# Patient Record
Sex: FEMALE | Race: BLACK OR AFRICAN AMERICAN | URBAN - METROPOLITAN AREA
[De-identification: names, ages, dates, MRNs, and addresses within clinical notes are randomized per-mention and may not be internally consistent; named-entity substitution may affect disease eponyms.]

---

## 2017-04-21 ENCOUNTER — OFFICE VISIT (OUTPATIENT)
Dept: FAMILY MEDICINE CLINIC | Age: 22
End: 2017-04-21

## 2017-04-21 VITALS
WEIGHT: 163 LBS | SYSTOLIC BLOOD PRESSURE: 110 MMHG | DIASTOLIC BLOOD PRESSURE: 64 MMHG | OXYGEN SATURATION: 98 % | HEART RATE: 100 BPM | RESPIRATION RATE: 15 BRPM | BODY MASS INDEX: 30 KG/M2 | HEIGHT: 62 IN | TEMPERATURE: 97.7 F

## 2017-04-21 DIAGNOSIS — Z23 ENCOUNTER FOR IMMUNIZATION: ICD-10-CM

## 2017-04-21 DIAGNOSIS — Z00.00 PHYSICAL EXAM: Primary | ICD-10-CM

## 2017-04-21 NOTE — MR AVS SNAPSHOT
Visit Information Date & Time Provider Department Dept. Phone Encounter #  
 4/21/2017  2:00 PM Gregory Sandoval MD [facility] [phone] [id number] Upcoming Health Maintenance Date Due Hepatitis A Peds Age 1-18 (1 of 2 - Standard Series) 8/25/1997 HPV AGE 9Y-26Y (1 of 3 - Female 3 Dose Series) 8/25/2007 INFLUENZA AGE 9 TO ADULT 8/1/2016 DTaP/Tdap/Td series (2 - Td) 5/19/2017 Allergies as of 4/21/2017  Review Complete On: 4/21/2017 By: Leslie Saucedo LPN Severity Noted Reaction Type Reactions Penicillins  08/28/2015    Hives Current Immunizations  Never Reviewed Name Date  
 TB Skin Test (PPD) Intradermal 4/21/2017 Tdap 4/21/2017 Not reviewed this visit You Were Diagnosed With   
  
 Codes Comments Physical exam    -  Primary ICD-10-CM: Z00.00 ICD-9-CM: V70.9 Encounter for immunization     ICD-10-CM: S35 ICD-9-CM: V03.89 Vitals BP Pulse Temp Resp Height(growth percentile) Weight(growth percentile) 110/64 100 97.7 °F (36.5 °C) (Temporal) 15 5' 2\" (1.575 m) 163 lb (73.9 kg) LMP SpO2 BMI OB Status Smoking Status 04/10/2017 98% 29.81 kg/m2 Having regular periods Never Smoker Vitals History BMI and BSA Data Body Mass Index Body Surface Area  
 29.81 kg/m 2 1.8 m 2 Your Updated Medication List  
  
   
This list is accurate as of: 4/21/17  3:00 PM.  Always use your most recent med list.  
  
  
  
  
 LEVBID 0.375 mg SR tablet Generic drug:  hyoscyamine SR Take 375 mcg by mouth every twelve (12) hours as needed for Cramping. PriLOSEC 40 mg capsule Generic drug:  omeprazole Take 40 mg by mouth daily. We Performed the Following AMB POC TUBERCULOSIS, INTRADERMAL (SKIN TEST) [69361 CPT(R)] TETANUS, DIPHTHERIA TOXOIDS AND ACELLULAR PERTUSSIS VACCINE (TDAP), IN INDIVIDS. >=7, IM [id number] CPT(R)] Patient Instructions Tuberculin Skin Test: Care Instructions Your Care Instructions Tuberculosis (TB) is a bacterial infection that can damage the lungs or other parts of the body. The TB skin test can tell if you have TB bacteria in your body. Many people are exposed to TB and test positive for TB bacteria in their bodies, but they don't get the disease. TB bacteria can stay in your body without making you sick. This is because your immune system can keep TB in check. Your doctor may want you to have a TB skin test if you have been in close contact with someone who has TB. Or you may need the test if you have symptoms that might be caused by TB, such as a cough that does not go away, a fever, or weight loss. You also may get the test if you are a health care worker. During the skin test, part of a TB bacterium is injected under your skin. The test will feel like a skin prick. If you have TB bacteria in your body, a firm red bump will form at the shot site within 2 days. If the test shows that you are infected with TB (positive), your doctor probably will order more tests. A TB-positive skin test can't tell when you became infected with TB. And it can't tell whether the infection can be passed to others. Follow-up care is a key part of your treatment and safety. Be sure to make and go to all appointments, and call your doctor if you are having problems. It's also a good idea to know your test results and keep a list of the medicines you take. How can you care for yourself at home? · Do not scratch the test site. Scratching it may cause redness or swelling. This could affect the test results. · To ease itching, put a cold washcloth on the site. Then pat the site dry. · Do not cover the test site with a bandage or other dressing. · Go back to your doctor's office or hospital to have the test read on the follow-up date. This must be done between 48 and 72 hours after you get the shot. When should you call for help? Watch closely for changes in your health, and be sure to contact your doctor if: 
· You did not have your TB skin test checked by your doctor. · You have a fever or swelling in your arm. · You do not get better as expected. Where can you learn more? Go to http://marisol-kike.info/. Enter ([phone] in the search box to learn more about \"Tuberculin Skin Test: Care Instructions. \" Current as of: May 24, 2016 Content Version: 11.2 © 4657-4781 [employer]. Care instructions adapted under license by [employer] (which disclaims liability or warranty for this information). If you have questions about a medical condition or this instruction, always ask your healthcare professional. [address] any warranty or liability for your use of this information. Introducing [facility]! Anna Marie Saha introduces [employer] patient portal. Now you can access parts of your medical record, email your doctor's office, and request medication refills online. 1. In your internet browser, go to [email or web address] 2. Click on the First Time User? Click Here link in the Sign In box. You will see the New Member Sign Up page. 3. Enter your [employer] Access Code exactly as it appears below. You will not need to use this code after youve completed the sign-up process. If you do not sign up before the expiration date, you must request a new code. · [employer] Access Code: [email or web address] Expires: 7/20/2017  3:00 PM 
 
4. Enter the last four digits of your Social Security Number (xxxx) and Date of Birth (mm/dd/yyyy) as indicated and click Submit. You will be taken to the next sign-up page. 5. Create a [employer] ID. This will be your [employer] login ID and cannot be changed, so think of one that is secure and easy to remember. 6. Create a [employer] password. You can change your password at any time. 7. Enter your Password Reset Question and Answer. This can be used at a later time if you forget your password. 8. Enter your e-mail address. You will receive e-mail notification when new information is available in [address] 9. Click Sign Up. You can now view and download portions of your medical record. 10. Click the Download Summary menu link to download a portable copy of your medical information. If you have questions, please visit the Frequently Asked Questions section of the [employer]ojin website. Remember, [employer]oj[employer] is NOT to be used for urgent needs. For medical emergencies, dial 911. Now available from your iPhone and Android! Please provide this summary of care documentation to your next provider. If you have any questions after today's visit, please call [phone].

## 2017-04-21 NOTE — PROGRESS NOTES
Subjective:   Amrit Burger is a 22 y.o. female who presents for a  physical exam. She denies any current medical problems or concerns. Questionnaire and forms reviewed with her and responses verified. Allergies   Allergen Reactions    Penicillins Hives      Review of Systems    A comprehensive review of systems was negative except for that written in the HPI. Objective:     Visit Vitals    /64    Pulse 100    Temp 97.7 °F (36.5 °C) (Temporal)    Resp 15    Ht 5' 2\" (1.575 m)    Wt 163 lb (73.9 kg)    LMP 04/10/2017    SpO2 98%    BMI 29.81 kg/m2     General:  Alert, cooperative, no distress, appears stated age. Head:  Normocephalic, without obvious abnormality, atraumatic. Eyes:  Conjunctivae/corneas clear. PERRL, EOMs intact. Fundi benign. Ears:  Normal TMs and external ear canals both ears. Nose: Nares normal. Septum midline. Mucosa normal. No drainage or sinus tenderness. Throat: Lips, mucosa, and tongue normal. Teeth and gums normal.   Neck: Supple, symmetrical, trachea midline, no adenopathy, thyroid: no enlargement/tenderness/nodules, no carotid bruit and no JVD. Back:   Symmetric, no curvature. ROM normal. No CVA tenderness. Lungs:   Clear to auscultation bilaterally. Chest wall:  No tenderness or deformity. Heart:  Regular rate and rhythm, S1, S2 normal, no murmur, click, rub or gallop. Abdomen:   Soft, non-tender. Bowel sounds normal. No masses,  No organomegaly. Extremities: Extremities normal, atraumatic, no cyanosis or edema. Pulses: 2+ and symmetric all extremities. Skin: Skin color, texture, turgor normal. No rashes or lesions. Lymph nodes: Cervical, supraclavicular, and axillary nodes normal.   Neurologic: CNII-XII intact. Normal strength, sensation and reflexes throughout. Assessment/Plan:   Ms. Lonny Thomas is a healthy 22 y.o.  female who is medically cleared for school  . ICD-10-CM ICD-9-CM    1.  Physical exam Z00.00 V70.9    2.  Encounter for immunization Z23 V03.89 AMB POC TUBERCULOSIS, INTRADERMAL (SKIN TEST)      TETANUS, DIPHTHERIA TOXOIDS AND ACELLULAR PERTUSSIS VACCINE (TDAP), IN INDIVIDS. >=7, IM     Follow-up Disposition: Not on Princess Amaya MD

## 2017-04-21 NOTE — PATIENT INSTRUCTIONS
Tuberculin Skin Test: Care Instructions  Your Care Instructions    Tuberculosis (TB) is a bacterial infection that can damage the lungs or other parts of the body. The TB skin test can tell if you have TB bacteria in your body. Many people are exposed to TB and test positive for TB bacteria in their bodies, but they don't get the disease. TB bacteria can stay in your body without making you sick. This is because your immune system can keep TB in check. Your doctor may want you to have a TB skin test if you have been in close contact with someone who has TB. Or you may need the test if you have symptoms that might be caused by TB, such as a cough that does not go away, a fever, or weight loss. You also may get the test if you are a health care worker. During the skin test, part of a TB bacterium is injected under your skin. The test will feel like a skin prick. If you have TB bacteria in your body, a firm red bump will form at the shot site within 2 days. If the test shows that you are infected with TB (positive), your doctor probably will order more tests. A TB-positive skin test can't tell when you became infected with TB. And it can't tell whether the infection can be passed to others. Follow-up care is a key part of your treatment and safety. Be sure to make and go to all appointments, and call your doctor if you are having problems. It's also a good idea to know your test results and keep a list of the medicines you take. How can you care for yourself at home? · Do not scratch the test site. Scratching it may cause redness or swelling. This could affect the test results. · To ease itching, put a cold washcloth on the site. Then pat the site dry. · Do not cover the test site with a bandage or other dressing. · Go back to your doctor's office or hospital to have the test read on the follow-up date. This must be done between 48 and 72 hours after you get the shot. When should you call for help?   Watch closely for changes in your health, and be sure to contact your doctor if:  · You did not have your TB skin test checked by your doctor. · You have a fever or swelling in your arm. · You do not get better as expected. Where can you learn more? Go to http://marisol-kike.info/. Enter [phone] in the search box to learn more about \"Tuberculin Skin Test: Care Instructions. \"  Current as of: May 24, 2016  Content Version: 11.2  © 6436-1733 [employer]. Care instructions adapted under license by "[employer] (which disclaims liability or warranty for this information). If you have questions about a medical condition or this instruction, always ask your healthcare professional. [address] any warranty or liability for your use of this information.

## 2017-04-24 LAB
MM INDURATION POC: NORMAL MM (ref 0–5)
PPD POC: NORMAL NEGATIVE

## 2025-08-09 ENCOUNTER — HOSPITAL ENCOUNTER (OUTPATIENT)
Facility: CLINIC | Age: 30
Discharge: HOME | End: 2025-08-09
Attending: FAMILY MEDICINE
Payer: COMMERCIAL

## 2025-08-09 VITALS
DIASTOLIC BLOOD PRESSURE: 62 MMHG | RESPIRATION RATE: 18 BRPM | OXYGEN SATURATION: 99 % | HEIGHT: 61 IN | SYSTOLIC BLOOD PRESSURE: 114 MMHG | TEMPERATURE: 98.6 F | BODY MASS INDEX: 42.48 KG/M2 | WEIGHT: 225 LBS | HEART RATE: 81 BPM

## 2025-08-09 DIAGNOSIS — J02.9 SORE THROAT: Primary | ICD-10-CM

## 2025-08-09 DIAGNOSIS — K12.0 ORAL APHTHOUS ULCER: ICD-10-CM

## 2025-08-09 PROBLEM — M54.50 LOW BACK PAIN: Status: ACTIVE | Noted: 2019-05-20

## 2025-08-09 PROBLEM — L20.9 ATOPIC DERMATITIS: Status: ACTIVE | Noted: 2025-08-09

## 2025-08-09 PROBLEM — E66.01 MORBID OBESITY (CMS/HCC): Status: ACTIVE | Noted: 2021-02-26

## 2025-08-09 PROBLEM — M62.838 TRAPEZIUS MUSCLE SPASM: Status: ACTIVE | Noted: 2025-08-09

## 2025-08-09 PROBLEM — B35.9 TINEA: Status: ACTIVE | Noted: 2025-08-09

## 2025-08-09 PROBLEM — E55.9 VITAMIN D DEFICIENCY: Status: ACTIVE | Noted: 2019-05-28

## 2025-08-09 PROBLEM — E78.5 HYPERLIPIDEMIA: Status: ACTIVE | Noted: 2025-08-09

## 2025-08-09 PROBLEM — F41.9 ANXIETY: Status: ACTIVE | Noted: 2025-08-09

## 2025-08-09 PROBLEM — G62.9 PERIPHERAL NERVE DISEASE: Status: ACTIVE | Noted: 2019-05-28

## 2025-08-09 PROBLEM — E53.8 COBALAMIN DEFICIENCY: Status: ACTIVE | Noted: 2019-05-28

## 2025-08-09 PROBLEM — L25.9 CONTACT DERMATITIS: Status: ACTIVE | Noted: 2025-08-09

## 2025-08-09 PROBLEM — D25.9 UTERINE FIBROID: Status: ACTIVE | Noted: 2025-08-09

## 2025-08-09 PROBLEM — G89.29 CHRONIC PELVIC PAIN IN FEMALE: Status: ACTIVE | Noted: 2025-08-09

## 2025-08-09 PROBLEM — R10.2 CHRONIC PELVIC PAIN IN FEMALE: Status: ACTIVE | Noted: 2025-08-09

## 2025-08-09 PROBLEM — L70.9 ACNE: Status: ACTIVE | Noted: 2025-08-09

## 2025-08-09 PROBLEM — K21.9 GASTROESOPHAGEAL REFLUX DISEASE: Status: ACTIVE | Noted: 2021-02-26

## 2025-08-09 PROBLEM — L03.90 CELLULITIS: Status: ACTIVE | Noted: 2021-04-28

## 2025-08-09 PROBLEM — L21.9 SEBORRHEIC DERMATITIS: Status: ACTIVE | Noted: 2025-08-09

## 2025-08-09 LAB
EXPIRATION DATE: NORMAL
EXPIRATION DATE: NORMAL
HETEROPH AB SER QL LA: NEGATIVE
Lab: NORMAL
Lab: NORMAL
POCT MANUFACTURER: NORMAL
POCT MANUFACTURER: NORMAL
S PYO AG THROAT QL: NEGATIVE

## 2025-08-09 PROCEDURE — 87880 STREP A ASSAY W/OPTIC: CPT | Performed by: FAMILY MEDICINE

## 2025-08-09 PROCEDURE — 87081 CULTURE SCREEN ONLY: CPT | Performed by: FAMILY MEDICINE

## 2025-08-09 PROCEDURE — 99203 OFFICE O/P NEW LOW 30 MIN: CPT | Performed by: FAMILY MEDICINE

## 2025-08-09 PROCEDURE — 86308 HETEROPHILE ANTIBODY SCREEN: CPT | Performed by: FAMILY MEDICINE

## 2025-08-09 PROCEDURE — S9083 URGENT CARE CENTER GLOBAL: HCPCS | Performed by: FAMILY MEDICINE

## 2025-08-09 RX ORDER — NORETHINDRONE ACETATE AND ETHINYL ESTRADIOL, AND FERROUS FUMARATE 1MG-20(24)
1 KIT ORAL DAILY
COMMUNITY
Start: 2025-08-01

## 2025-08-09 RX ORDER — TIRZEPATIDE 2.5 MG/.5ML
2.5 INJECTION, SOLUTION SUBCUTANEOUS
COMMUNITY
Start: 2025-05-23

## 2025-08-09 RX ORDER — BUSPIRONE HYDROCHLORIDE 10 MG/1
10 TABLET ORAL 2 TIMES DAILY
COMMUNITY
Start: 2025-08-01

## 2025-08-09 RX ORDER — SERTRALINE HYDROCHLORIDE 25 MG/1
25 TABLET, FILM COATED ORAL EVERY EVENING
COMMUNITY
Start: 2025-08-01

## 2025-08-10 NOTE — ED PROVIDER NOTES
History  Chief Complaint   Patient presents with    Sore Throat     ST, occurring for three days, discomfort when swallowing, denies fever or NV,  reports HA, patient works in [employer] as a nurse, NKE to illnesses      C/o sore throat and fatigue since Thursday   No fevers, chills, body aches   No URI symptoms   No flu-like symptoms   Hurts with swallowing         Sore Throat      No past medical history on file.    No past surgical history on file.    No family history on file.         Review of Systems   HENT:  Positive for sore throat.    All other systems reviewed and are negative.      Physical Exam  ED Triage Vitals [08/09/25 2013]   Temp Heart Rate Resp BP SpO2   37 °C (98.6 °F) 81 18 114/62 99 %      Temp Source Heart Rate Source Patient Position BP Location FiO2 (%) (Set)   Oral -- Sitting Left upper arm --       Physical Exam  Vitals and nursing note reviewed.   Constitutional:       General: She is not in acute distress.     Appearance: She is not ill-appearing, toxic-appearing or diaphoretic.   HENT:      Head: Normocephalic.      Mouth/Throat:      Pharynx: Uvula midline. No pharyngeal swelling, oropharyngeal exudate, uvula swelling or postnasal drip.      Tonsils: No tonsillar exudate or tonsillar abscesses. 0 on the right. 0 on the left.        Comments: Apthous ulcer on right oropharynx with localized erythema. Otherwise, oropharynx appears normal.   Eyes:      Conjunctiva/sclera: Conjunctivae normal.   Pulmonary:      Effort: Pulmonary effort is normal. No respiratory distress.   Neurological:      Mental Status: She is alert and oriented to person, place, and time.   Psychiatric:         Mood and Affect: Mood normal.           Procedures  Procedures    UC Course       Medical Decision Making  #Sore throat  -Patient appears well, NAD, VSS.  No drooling, stridor, dysphonia. No uvular deviation.  No trouble swallowing.  No trismus.  No peritonsillar or palatal abscess. No systemic symptoms.  Rapid strep  negative.  Mono negative.  Exam notable for aphthous ulcer and area of throat pain.  Recommend salt water gargles.  Tylenol/Motrin as needed.  Advise follow-up with PCP for any persisting symptoms.  To the ER with any concerning symptoms.    Strict return precautions given. Patient was given opportunity to ask questions and all were answered. Patient verbalized understanding and agreeable with plan. See dispo for full care plan.                      Selene Red DO  08/09/25 [id number]

## 2025-08-10 NOTE — DISCHARGE INSTRUCTIONS
Testing today:   - Rapid Strep Negative   - Mono negative     Your strep send out results typically post to your patient portal within 72 hours.     Recommendations:   Follow-up with your primary care physician for persisting symptoms    Go to the ER if symptoms worsen

## 2025-08-12 ENCOUNTER — TELEPHONE (OUTPATIENT)
Dept: URGENT CARE | Facility: CLINIC | Age: 30
End: 2025-08-12
Payer: COMMERCIAL

## 2025-08-12 LAB — B-HEM STREP SPEC QL CULT: NORMAL
